# Patient Record
Sex: FEMALE | Race: BLACK OR AFRICAN AMERICAN | NOT HISPANIC OR LATINO | Employment: OTHER | URBAN - METROPOLITAN AREA
[De-identification: names, ages, dates, MRNs, and addresses within clinical notes are randomized per-mention and may not be internally consistent; named-entity substitution may affect disease eponyms.]

---

## 2017-03-27 ENCOUNTER — OFFICE VISIT (OUTPATIENT)
Dept: RHEUMATOLOGY | Facility: CLINIC | Age: 82
End: 2017-03-27
Payer: MEDICARE

## 2017-03-27 ENCOUNTER — HOSPITAL ENCOUNTER (OUTPATIENT)
Dept: RADIOLOGY | Facility: HOSPITAL | Age: 82
Discharge: HOME | End: 2017-03-27
Attending: INTERNAL MEDICINE
Payer: MEDICARE

## 2017-03-27 VITALS
DIASTOLIC BLOOD PRESSURE: 72 MMHG | HEART RATE: 77 BPM | BODY MASS INDEX: 32.3 KG/M2 | SYSTOLIC BLOOD PRESSURE: 133 MMHG | WEIGHT: 201 LBS | HEIGHT: 66 IN

## 2017-03-27 DIAGNOSIS — M05.742 RHEUMATOID ARTHRITIS INVOLVING BOTH HANDS WITH POSITIVE RHEUMATOID FACTOR: ICD-10-CM

## 2017-03-27 DIAGNOSIS — M05.741 RHEUMATOID ARTHRITIS INVOLVING BOTH HANDS WITH POSITIVE RHEUMATOID FACTOR: ICD-10-CM

## 2017-03-27 DIAGNOSIS — M05.742 RHEUMATOID ARTHRITIS INVOLVING BOTH HANDS WITH POSITIVE RHEUMATOID FACTOR: Primary | ICD-10-CM

## 2017-03-27 DIAGNOSIS — M70.61 TROCHANTERIC BURSITIS, RIGHT HIP: ICD-10-CM

## 2017-03-27 DIAGNOSIS — M05.741 RHEUMATOID ARTHRITIS INVOLVING BOTH HANDS WITH POSITIVE RHEUMATOID FACTOR: Primary | ICD-10-CM

## 2017-03-27 PROCEDURE — 99205 OFFICE O/P NEW HI 60 MIN: CPT | Mod: S$PBB,25,, | Performed by: INTERNAL MEDICINE

## 2017-03-27 PROCEDURE — 73130 X-RAY EXAM OF HAND: CPT | Mod: 26,50,, | Performed by: RADIOLOGY

## 2017-03-27 PROCEDURE — 99203 OFFICE O/P NEW LOW 30 MIN: CPT | Mod: PBBFAC | Performed by: INTERNAL MEDICINE

## 2017-03-27 PROCEDURE — 73130 X-RAY EXAM OF HAND: CPT | Mod: 50,TC

## 2017-03-27 PROCEDURE — 99999 PR PBB SHADOW E&M-NEW PATIENT-LVL III: CPT | Mod: PBBFAC,,, | Performed by: INTERNAL MEDICINE

## 2017-03-27 RX ORDER — ONDANSETRON 4 MG/1
4 TABLET, FILM COATED ORAL EVERY 8 HOURS PRN
COMMUNITY

## 2017-03-27 RX ORDER — EXEMESTANE 25 MG/1
25 TABLET ORAL DAILY
COMMUNITY

## 2017-03-27 RX ORDER — LORAZEPAM 0.5 MG/1
0.5 TABLET ORAL DAILY
COMMUNITY

## 2017-03-27 RX ORDER — TRIAMCINOLONE ACETONIDE 40 MG/ML
40 INJECTION, SUSPENSION INTRA-ARTICULAR; INTRAMUSCULAR
Status: COMPLETED | OUTPATIENT
Start: 2017-03-27 | End: 2017-03-27

## 2017-03-27 RX ORDER — PANTOPRAZOLE SODIUM 40 MG/1
40 TABLET, DELAYED RELEASE ORAL 2 TIMES DAILY
COMMUNITY

## 2017-03-27 RX ADMIN — TRIAMCINOLONE ACETONIDE 40 MG: 40 INJECTION, SUSPENSION INTRA-ARTICULAR; INTRAMUSCULAR at 10:03

## 2017-03-27 ASSESSMENT — ROUTINE ASSESSMENT OF PATIENT INDEX DATA (RAPID3)
FATIGUE SCORE: 9.5
PSYCHOLOGICAL DISTRESS SCORE: 2.2
TOTAL RAPID3 SCORE: 7.89
MDHAQ FUNCTION SCORE: 1.4
PATIENT GLOBAL ASSESSMENT SCORE: 9.5
PSYCHOLOGICAL DISTRESS SCORE: 2.2
TOTAL RAPID3 SCORE: 7.89
PATIENT GLOBAL ASSESSMENT SCORE: 9.5
FATIGUE SCORE: 9.5
AM STIFFNESS SCORE: 1, YES
PAIN SCORE: 9.5
PAIN SCORE: 9.5
MDHAQ FUNCTION SCORE: 1.4

## 2017-03-27 NOTE — PATIENT INSTRUCTIONS
You may want to try Paraffin wax baths to your hands.     For time being take one methylprednisolone tablet every day.    Call us and let us know if you feel better on it.      Methylprednisolone tablets  What is this medicine?  METHYLPREDNISOLONE (meth ill pred NISS oh lone) is a corticosteroid. It is commonly used to treat inflammation of the skin, joints, lungs, and other organs. Common conditions treated include asthma, allergies, and arthritis. It is also used for other conditions, such as blood disorders and diseases of the adrenal glands.  How should I use this medicine?  Take this medicine by mouth with a drink of water. Follow the directions on the prescription label. Take it with food or milk to avoid stomach upset. If you are taking this medicine once a day, take it in the morning. Do not take more medicine than you are told to take. Do not suddenly stop taking your medicine because you may develop a severe reaction. Your doctor will tell you how much medicine to take. If your doctor wants you to stop the medicine, the dose may be slowly lowered over time to avoid any side effects.  Talk to your pediatrician regarding the use of this medicine in children. Special care may be needed.  What side effects may I notice from receiving this medicine?  Side effects that you should report to your doctor or health care professional as soon as possible:  · allergic reactions like skin rash, itching or hives, swelling of the face, lips, or tongue  · eye pain, decreased or blurred vision, or bulging eyes  · fever, sore throat, sneezing, cough, or other signs of infection, wounds that will not heal  · increased thirst  · mental depression, mood swings, mistaken feelings of self importance or of being mistreated  · pain in hips, back, ribs, arms, shoulders, or legs  · swelling of the ankles, feet, hands  · trouble passing urine or change in the amount of urine  Side effects that usually do not require medical attention  (report to your doctor or health care professional if they continue or are bothersome):  · confusion, excitement, restlessness  · headache  · nausea, vomiting  · skin problems, acne, thin and shiny skin  · weight gain  What may interact with this medicine?  Do not take this medicine with any of the following medications:  · mifepristone  This medicine may also interact with the following medications:  · tacrolimus  · vaccines  · warfarin  What if I miss a dose?  If you miss a dose, take it as soon as you can. If it is almost time for your next dose, talk to your doctor or health care professional. You may need to miss a dose or take an extra dose. Do not take double or extra doses without advice.  Where should I keep my medicine?  Keep out of the reach of children.  Store at room temperature between 20 and 25 degrees C (68 and 77 degrees F). Throw away any unused medicine after the expiration date.  What should I tell my health care provider before I take this medicine?  They need to know if you have any of these conditions:  · Cushing's syndrome  · diabetes  · glaucoma  · heart problems or disease  · high blood pressure  · infection such as herpes, measles, tuberculosis, or chickenpox  · kidney disease  · liver disease  · mental problems  · myasthenia gravis  · osteoporosis  · seizures  · stomach ulcer or intestine disease including colitis and diverticulitis  · thyroid problem  · an unusual or allergic reaction to lactose, methylprednisolone, other medicines, foods, dyes, or preservatives  · pregnant or trying to get pregnant  · breast-feeding  What should I watch for while using this medicine?  Visit your doctor or health care professional for regular checks on your progress. If you are taking this medicine for a long time, carry an identification card with your name and address, the type and dose of your medicine, and your doctor's name and address.  The medicine may increase your risk of getting an infection.  Stay away from people who are sick. Tell your doctor or health care professional if you are around anyone with measles or chickenpox.  If you are going to have surgery, tell your doctor or health care professional that you have taken this medicine within the last twelve months.  Ask your doctor or health care professional about your diet. You may need to lower the amount of salt you eat.  The medicine can increase your blood sugar. If you are a diabetic check with your doctor if you need help adjusting the dose of your diabetic medicine.  Date Last Reviewed:   NOTE:This sheet is a summary. It may not cover all possible information. If you have questions about this medicine, talk to your doctor, pharmacist, or health care provider. Copyright© 2016 [employer]

## 2017-03-27 NOTE — MR AVS SNAPSHOT
[facility]  [address]  [place] [address]  Phone: [phone]  Fax: [phone]                  Lucretia Murguia   3/27/2017 9:00 AM   Office Visit    Description:  Female : 1935   Provider:  Carmen Vilchis MD   Department:  [facility] - Rheumatology           Reason for Visit     Disease Management           Diagnoses this Visit        Comments    Rheumatoid arthritis involving both hands with positive rheumatoid factor    -  Primary     Trochanteric bursitis, right hip                To Do List           Future Appointments        Provider Department Dept Phone    3/27/2017 10:45 AM LAB, SAME DAY [facility] [phone]    3/27/2017 11:45 AM NOM [id number] LB LIMIT [facility] [phone]    2017 9:00 AM Carmen Vilchis MD [facility] [phone]      Goals (5 Years of Data)     None      Follow-Up and Disposition     Return in about 3 months (around 2017).      [facility]     [facility] Care [facility] - 24/7 Assistance  Registered nurses in the [facility] provide clinical advisement, health education, appointment booking, and other advisory services.  Call for this free service at [phone].             Medications           Message regarding Medications     Verify the changes and/or additions to your medication regime listed below are the same as discussed with your clinician today.  If any of these changes or additions are incorrect, please notify your healthcare provider.        These medications were administered today        Dose Freq    triamcinolone acetonide injection 40 mg 40 mg Clinic/HOD 1 time    Si mL (40 mg total) by INTRABURSAL route one time.    Class: Normal    Route: INTRABURSAL      STOP taking these medications     spironolactone (ALDACTONE) 25 MG tablet Take by mouth. 1 Tablet Oral Every day    valsartan (DIOVAN) 40 MG tablet Take by mouth.  1 Tablet Oral Every day    warfarin (COUMADIN) 6 MG tablet Take 1 tablet (6 mg total) by mouth Daily. At 5P as directed by coumadin clinic. Start on 3/03/13 (dose given on 3/02 prior to hospital discharge). Last dose on 3/23/13           Verify that the below list of medications is an accurate representation of the medications you are currently taking.  If none reported, the list may be blank. If incorrect, please contact your healthcare provider. Carry this list with you in case of emergency.           Current Medications     albuterol (PROAIR HFA) 90 mcg/actuation HFAA Inhale into the lungs. 2 HFA Aerosol Inhaler Inhalation Every 4-6 hours    amlodipine (NORVASC) 10 MG tablet Take by mouth. 1 Tablet Oral Every day.  Dispense as written.Do not substitute.    calcium citrate 250 mg calcium Tab Take by mouth. 1 Tablet Oral Every day    cholecalciferol, vitamin D3, 5,000 unit capsule Take by mouth. 1 Capsule Oral Every day    exemestane (AROMASIN) 25 mg tablet Take 25 mg by mouth once daily.    hydrocodone-acetaminophen (VICODIN) 5-500 mg per tablet     k phos di & mono-sod phos mono (K-PHOS-NEUTRAL) 250 mg Tab Take by mouth.    levocetirizine (XYZAL) 5 MG tablet Take by mouth. 1 Tablet Oral Every day    lorazepam (ATIVAN) 0.5 MG tablet Take 0.5 mg by mouth once daily.    mometasone (NASONEX) 50 mcg/actuation nasal spray 2 Spray, Non-Aerosol Nasal Every day    ondansetron (ZOFRAN) 4 MG tablet Take 4 mg by mouth every 8 (eight) hours as needed for Nausea.    oxycodone 10 mg Tab Take 10 mg by mouth every 6 (six) hours as needed (pain 6-7/10 pain scale score).    pantoprazole (PROTONIX) 40 MG tablet Take 40 mg by mouth 2 (two) times daily.    ranitidine (ZANTAC) 150 MG tablet Take 150 mg by mouth 2 (two) times daily.    senna-docusate 8.6-50 mg (PERICOLACE) 8.6-50 mg per tablet Take 1 tablet by mouth 2 (two) times daily as needed for Constipation.    atorvastatin (LIPITOR) 10 MG tablet Take by mouth. 1 Tablet Oral Every  "day    busPIRone (BUSPAR) 15 MG tablet Take 1 tablet (15 mg total) by mouth 3 (three) times daily.           Clinical Reference Information           Your Vitals Were     BP Pulse Height Weight BMI    133/72 77 5' 6" (1.676 m) 91.2 kg (201 lb) 32.44 kg/m2      Blood Pressure          Most Recent Value    BP  133/72      Allergies as of 3/27/2017     Ace Inhibitors    Codeine      Immunizations Administered on Date of Encounter - 3/27/2017     None      Orders Placed During Today's Visit     Future Labs/Procedures Expected by Expires    LEWIS  3/27/2017 3/27/2018    Sjogrens syndrome-A extractable nuclear antibody  3/27/2017 3/27/2018    X-Ray Hand Complete Bilateral  3/27/2017 3/27/2018    XR Arthritis Survey  3/27/2017 3/27/2018    Cyclic citrul peptide antibody, IgG  4/8/2017 3/27/2018    Rheumatoid factor  4/8/2017 3/27/2018    Recurring Lab Work Interval Expires    C-reactive protein   3/27/2018    CBC auto differential   3/27/2018    Comprehensive metabolic panel   3/27/2018    Sedimentation rate, manual   3/27/2018      Administrations This Visit     triamcinolone acetonide injection 40 mg     Admin Date Action Dose Route Administered By             03/27/2017 Given 40 mg INTRABURSAL Carmen Vilchis MD                      [facility] Sign-Up     Activating your [facility] account is as easy as 1-2-3!     1) Visit [email or web address], select Sign Up Now, enter this activation code and your date of birth, then select Next.  [email or web address]  Expires: 5/11/2017 10:10 AM      2) Create a username and password to use when you visit [facility] in the future and select a security question in case you lose your password and select Next.    3) Enter your e-mail address and click Sign Up!    Additional Information  If you have questions, please e-mail [email or web address] or call [phone] to talk to our [facility] staff. Remember, [facility] is NOT to be used for urgent needs. For medical emergencies, dial 911.    "      Instructions    You may want to try Paraffin wax baths to your hands.     For time being take one methylprednisolone tablet every day.    Call us and let us know if you feel better on it.      Methylprednisolone tablets  What is this medicine?  METHYLPREDNISOLONE (meth ill pred NISS oh lone) is a corticosteroid. It is commonly used to treat inflammation of the skin, joints, lungs, and other organs. Common conditions treated include asthma, allergies, and arthritis. It is also used for other conditions, such as blood disorders and diseases of the adrenal glands.  How should I use this medicine?  Take this medicine by mouth with a drink of water. Follow the directions on the prescription label. Take it with food or milk to avoid stomach upset. If you are taking this medicine once a day, take it in the morning. Do not take more medicine than you are told to take. Do not suddenly stop taking your medicine because you may develop a severe reaction. Your doctor will tell you how much medicine to take. If your doctor wants you to stop the medicine, the dose may be slowly lowered over time to avoid any side effects.  Talk to your pediatrician regarding the use of this medicine in children. Special care may be needed.  What side effects may I notice from receiving this medicine?  Side effects that you should report to your doctor or health care professional as soon as possible:  · allergic reactions like skin rash, itching or hives, swelling of the face, lips, or tongue  · eye pain, decreased or blurred vision, or bulging eyes  · fever, sore throat, sneezing, cough, or other signs of infection, wounds that will not heal  · increased thirst  · mental depression, mood swings, mistaken feelings of self importance or of being mistreated  · pain in hips, back, ribs, arms, shoulders, or legs  · swelling of the ankles, feet, hands  · trouble passing urine or change in the amount of urine  Side effects that usually do not  require medical attention (report to your doctor or health care professional if they continue or are bothersome):  · confusion, excitement, restlessness  · headache  · nausea, vomiting  · skin problems, acne, thin and shiny skin  · weight gain  What may interact with this medicine?  Do not take this medicine with any of the following medications:  · mifepristone  This medicine may also interact with the following medications:  · tacrolimus  · vaccines  · warfarin  What if I miss a dose?  If you miss a dose, take it as soon as you can. If it is almost time for your next dose, talk to your doctor or health care professional. You may need to miss a dose or take an extra dose. Do not take double or extra doses without advice.  Where should I keep my medicine?  Keep out of the reach of children.  Store at room temperature between 20 and 25 degrees C (68 and 77 degrees F). Throw away any unused medicine after the expiration date.  What should I tell my health care provider before I take this medicine?  They need to know if you have any of these conditions:  · Cushing's syndrome  · diabetes  · glaucoma  · heart problems or disease  · high blood pressure  · infection such as herpes, measles, tuberculosis, or chickenpox  · kidney disease  · liver disease  · mental problems  · myasthenia gravis  · osteoporosis  · seizures  · stomach ulcer or intestine disease including colitis and diverticulitis  · thyroid problem  · an unusual or allergic reaction to lactose, methylprednisolone, other medicines, foods, dyes, or preservatives  · pregnant or trying to get pregnant  · breast-feeding  What should I watch for while using this medicine?  Visit your doctor or health care professional for regular checks on your progress. If you are taking this medicine for a long time, carry an identification card with your name and address, the type and dose of your medicine, and your doctor's name and address.  The medicine may increase your risk  of getting an infection. Stay away from people who are sick. Tell your doctor or health care professional if you are around anyone with measles or chickenpox.  If you are going to have surgery, tell your doctor or health care professional that you have taken this medicine within the last twelve months.  Ask your doctor or health care professional about your diet. You may need to lower the amount of salt you eat.  The medicine can increase your blood sugar. If you are a diabetic check with your doctor if you need help adjusting the dose of your diabetic medicine.  Date Last Reviewed:   NOTE:This sheet is a summary. It may not cover all possible information. If you have questions about this medicine, talk to your doctor, pharmacist, or health care provider. Copyright© 2016 [employer]             Language Assistance Services     ATTENTION: Language assistance services are available, free of charge. Please call [phone].      ATENCIÓN: Si rakesh boone, tiene a seay disposición servicios gratuitos de asistencia lingüística. Llame al [phone].     RUTHANN Ý: N?u b?n nói Ti?ng Vi?t, có các d?ch v? h? tr? ngôn ng? mi?n phí dành cho b?n. G?i s? [phone].         [facility] complies with applicable Federal civil rights laws and does not discriminate on the basis of race, color, national origin, age, disability, or sex.

## 2017-03-27 NOTE — PROGRESS NOTES
"Subjective:       Patient ID: Lucretia Murguia is a 81 y.o. female.    Chief Complaint: Disease Management    Primary care MD is Richa Ames MD  HPI   81 yr [facility] .  Was dx with RA by Dr. Jann Bateman in June 2016. Woke up abruptly one day and could not move. Had swelling at base of thumbs and across MCPs. Maybe some swelling. She was given rxs  methylprednisolone 4 mg alternating with 2 mg & was also given MTX 15 mg/wk & folic acid 1 mg. But also was taking 2 cancer drugs (including exemestance) & a number of other meds and developed weakness, dizziness, could not get out of bed & just did not feel well. When she saw him again he stopped all three meds and referred her back to her oncologist.    Patient was dx with left breast cancer 1998 and more recently it was discovered in at least her bones and liver. She had a PE in December & is on lovenox.  She has undergone radiation rx and is currently on exemestane and was on everolimus in the recent past.     Currently she has mostly basilar thumb pain (from 1st C-MCP to 1st MCP bilaterally), bilateral shoulders L>R; bilateral ankles; also has R buttock and R "hip" pain that radiates to knee. AM stiffness which is all over lasts all day but better after 3 hrs. Denies Raynaud's, dysphagia, tight skin, alopecia, oral ulcers, sicca symptoms, pleurisy, pericarditis, photosensitivity, skin rashes, miscarriages (0/3);  Had PE about 3 months ago; now on lovenox.  There is no family hx of CTD but has a granddaughter with thyroid disease.       Current Outpatient Prescriptions   Medication Sig Dispense Refill    albuterol (PROAIR HFA) 90 mcg/actuation HFAA Inhale into the lungs. 2 HFA Aerosol Inhaler Inhalation Every 4-6 hours      amlodipine (NORVASC) 10 MG tablet Take by mouth. 1 Tablet Oral Every day.  Dispense as written.Do not substitute.      calcium citrate 250 mg " calcium Tab Take by mouth. 1 Tablet Oral Every day      cholecalciferol, vitamin D3, 5,000 unit capsule Take by mouth. 1 Capsule Oral Every day      exemestane (AROMASIN) 25 mg tablet Take 25 mg by mouth once daily.      hydrocodone-acetaminophen (VICODIN) 5-500 mg per tablet       k phos di & mono-sod phos mono (K-PHOS-NEUTRAL) 250 mg Tab Take by mouth.      levocetirizine (XYZAL) 5 MG tablet Take by mouth. 1 Tablet Oral Every day      lorazepam (ATIVAN) 0.5 MG tablet Take 0.5 mg by mouth once daily.      mometasone (NASONEX) 50 mcg/actuation nasal spray 2 Spray, Non-Aerosol Nasal Every day      ondansetron (ZOFRAN) 4 MG tablet Take 4 mg by mouth every 8 (eight) hours as needed for Nausea.      oxycodone 10 mg Tab Take 10 mg by mouth every 6 (six) hours as needed (pain 6-7/10 pain scale score). 60 each 0    pantoprazole (PROTONIX) 40 MG tablet Take 40 mg by mouth 2 (two) times daily.      ranitidine (ZANTAC) 150 MG tablet Take 150 mg by mouth 2 (two) times daily.      senna-docusate 8.6-50 mg (PERICOLACE) 8.6-50 mg per tablet Take 1 tablet by mouth 2 (two) times daily as needed for Constipation.      atorvastatin (LIPITOR) 10 MG tablet Take by mouth. 1 Tablet Oral Every day      busPIRone (BUSPAR) 15 MG tablet Take 1 tablet (15 mg total) by mouth 3 (three) times daily. 90 tablet 1    warfarin (COUMADIN) 6 MG tablet Take 1 tablet (6 mg total) by mouth Daily. At 5P as directed by coumadin clinic. Start on 3/03/13 (dose given on 3/02 prior to hospital discharge). Last dose on 3/23/13 30 tablet 1     No current facility-administered medications for this visit.      Has not been on coumadin lately--only on it after RTKR  Review of patient's allergies indicates:   Allergen Reactions    Ace inhibitors Other (See Comments)    Codeine Nausea And Vomiting and Other (See Comments)     Past Medical History:   Diagnosis Date    Arthritis     Cancer     breast s/p lumpectomy    COPD (chronic obstructive  "pulmonary disease)     Hypertension      Past Surgical History:   Procedure Laterality Date    CHOLECYSTECTOMY      HYSTERECTOMY      MASTECTOMY      right knee replacement  02/20/13           Review of Systems   Constitutional: Positive for fatigue. Negative for diaphoresis and fever.   HENT: Negative for mouth sores, sore throat, tinnitus and trouble swallowing.    Eyes: Negative.  Negative for visual disturbance.   Respiratory: Negative.  Negative for cough, choking, chest tightness and shortness of breath.    Cardiovascular: Positive for leg swelling. Negative for chest pain and palpitations.   Gastrointestinal: Negative.  Negative for abdominal distention, abdominal pain, blood in stool, constipation, diarrhea, nausea and vomiting.   Genitourinary: Negative for frequency, hematuria and menstrual problem.   Musculoskeletal: Positive for arthralgias, back pain, gait problem and joint swelling. Negative for myalgias, neck pain and neck stiffness.   Skin: Negative.  Negative for rash.   Neurological: Positive for headaches. Negative for dizziness, syncope, weakness, light-headedness and numbness.   Hematological: Negative for adenopathy. Does not bruise/bleed easily.   Psychiatric/Behavioral: Positive for sleep disturbance. Negative for dysphoric mood. The patient is nervous/anxious.        FH: Granddaughter 43 has thyroid issues  Social History   Substance Use Topics    Smoking status: Former Smoker     Packs/day: 1.00     Years: 20.00     Types: Cigarettes    Smokeless tobacco: Never Used    Alcohol use No   Happily  since 1977, but was with her  since the 1950's.       Objective:     /72  Pulse 77  Ht 5' 6" (1.676 m)  Wt 91.2 kg (201 lb)  BMI 32.44 kg/m2  Accompanied by supportive .   Physical Exam   Vitals reviewed.  Constitutional: She is oriented to person, place, and time and well-developed, well-nourished, and in no distress. No distress.   HENT:   Head: Normocephalic " and atraumatic.   Mouth/Throat: Oropharynx is clear and moist. No oropharyngeal exudate.   No facial rashes  Parotids not enlarged  No oral ulcers   Eyes: Conjunctivae and EOM are normal. Pupils are equal, round, and reactive to light. Right eye exhibits no discharge. Left eye exhibits no discharge. No scleral icterus.   Neck: Neck supple. No JVD present. No tracheal deviation present. No thyromegaly present.   Cardiovascular: Normal rate, regular rhythm and intact distal pulses.  Exam reveals no gallop and no friction rub.    Murmur heard.  Pulmonary/Chest: Effort normal and breath sounds normal. No respiratory distress. She has no wheezes. She has no rales. She exhibits no tenderness.   Abdominal: Soft. Bowel sounds are normal. She exhibits no distension and no mass. There is no splenomegaly or hepatomegaly. There is no tenderness. There is no rebound and no guarding.   Lymphadenopathy:     She has no cervical adenopathy.        Right: No inguinal adenopathy present.        Left: No inguinal adenopathy present.   Neurological: She is alert and oriented to person, place, and time. She has normal reflexes. No cranial nerve deficit. Gait normal.   Proximal and distal muscle strength 5/5.   Skin: Skin is warm and dry. No rash noted. She is not diaphoretic.     Psychiatric: Mood, memory, affect and judgment normal.   Musculoskeletal: Normal range of motion. She exhibits no edema or tenderness.   Cspine FROM no tenderness  Tspine FROM no tenderness  Lspine FROM no tenderness.  TMJ: unremarkable  Shoulders: FROM on R: left missing a few degrees of abduction and internal rotation; no synovitis;  Elbows: FROM; no synovitis; no tophi or nodules  Wrists: FROM; no synovitis; mild tenderness to palpation.    MCPs: FROM; min SHT1, 2& 3 bilaterally with bilateral + metacarpalgia;  ok;  PIPs:FROM; no synovitis;   DIPs: FROM; no synovitis;   HIPS: Tender  R trochanteric bursa; otherwise ok;  Knees: Bilateral bony hypertrophy;  R TKR scar; bilaterally;  no synovitis; no instability;  Ankles: FROM: no synovitis   Toes: ok; no metatarsalgia;             3/14/17: [facility]:WC 2.6; Hg 11.9; Ht 37.3; K+3.3; Ca 8.3; Alb. 2.9;Tbili , SGOT; SGPT; AP ok;  2/3/17: [facility]: WC 3.4;    6/15/16: [facility]: ESR 44; CRP 0.6; CBC ok;  (,14); CCP neg;    10/5/16: PET scan: multiple hypermetabolic hepatic lesions & worsening metastatic osseous lesions of thoracolumbar spine and pelvis c/w mets; also hypermetabolic mediastinal & RHLA concerning for mets; interval improvement RLL; & R 6 & 7 ribs;   Interval improvement left humeral head sclerotic lesion; asymetric soft tissue fullness at aryepiglottic folds..  Assessment:   Joint pain multiple sites   Hands, shoulders, ankles, R buttock & R hip    R/O bone mets for buttock, hip, shoulders    RA for hands/wrists    Sero+ (); CCP neg RA--mild   Involving MCPs & wrists    R trochanteric bursitis    OA knees   S/P TKR 2/20/13    GERD    Hypertension    Dyslipidemia    S/P L mastectomy for breast Ca 1998 now with mets to liver, spine, pelvis    Plan:   Labs and imaging.  Discussed trochanteric bursitis and possibility that it may respond to intrabursal injection. Patient agreed to it.    PROCEDURE NOTE:  Local and systemic side effects and toxicities of steroids were discussed prior to procedure.  With patient's request and permission the right trochanteric area was sterilely prepped. Topical anesthetic was sprayed and a mixture of  lidocaine 1% and 40 mg kenalog was injected into the bursa. Patient tolerated the procedure well. Icing and contacting us was recommended it if it begins to hurt.     Discussed RA and rx, but given other problems, possibly may be able to just treat symptomatically with steroids.  Patient asked to resume methylprednisolone 4 mg/day and to let us know in a few days how she is feeling with this.   Our w/u will give us more information as to treatment.    RTC 3 months      C.C:  MD Connie Rueda MD ([facility])      Addendum: 3/27/17: ESR 55; CRP 25.7; WC 3.43; Alb. 3.4; RF 11; CCP <0.5    3/27/17: Bilateral hands personally reviewed: Left hand: No erosive arthropathy involving the interphalangeal joints or the wrist joint.  There is however mild osteoarthritic changes of the interphalangeal joints of all digits.  Soft tissues are unremarkable. Right hand: There is also DJD of the interphalangeal joint of the thumb and mild DJD of the distal interphalangeal joints.  No significant erosive arthropathy however noted.  Mild DJD of the metacarpal phalangeal joints.  This joints are unremarkable

## 2017-04-06 ENCOUNTER — TELEPHONE (OUTPATIENT)
Dept: RHEUMATOLOGY | Facility: CLINIC | Age: 82
End: 2017-04-06

## 2017-04-06 NOTE — TELEPHONE ENCOUNTER
----- Message from Gus Milian sent at 4/6/2017  9:16 AM CDT -----  Contact: self  Pt request a call regarding her lab results.

## 2017-04-06 NOTE — TELEPHONE ENCOUNTER
"Requesting lab results    Called patient and discussed labs and imaging with her.  The injection in her R trochanteric bursa helped a bit but she is still having some pain.    Could not confirm RA by labs or imaging.  Discussed negative RF, CCP and absence of RA changes on imaging.     However, ESR and CRP are elevated but these could be elevated due to underlying metastatic breast cancer.     She will be getting an MRI on 4/18/17 at [facility] and it will probably be of areas c/w mets on PET scan done in October: TL spine & pelvis included.    She was asked to resume medrol for a few days to see if it has an impact on her "hip"  & other joint pain and if not to stop it.    "